# Patient Record
(demographics unavailable — no encounter records)

---

## 2025-03-25 NOTE — ASSESSMENT
[FreeTextEntry1] : 47-year-old male with pmh of HTN, HLD, anxiety presents for consultation of elevated LFTs, hepatosplenomegaly, and fatty liver.  Hepatosplenomegaly -CT abdo ordered   Elevated LFTs -Labs ordered to better establish etiology  Fatty liver -Weight loss through diet and exercise advised  Alcohol use disorder  -Counseled on cessation of alcohol use    Michelle MAYNARD NP, acted as scribe for JERICHO Moore for this patient encounter.

## 2025-03-25 NOTE — HISTORY OF PRESENT ILLNESS
[de-identified] : 47-year-old male with pmh of HTN, HLD, anxiety presents for consultation of elevated LFTs and fatty liver.   Alcohol consumption: 2-5 drinks about 4 days a week  Tattoos: no   OTC or herbal supplements: no Surgical history: no Family history/ Liver disease:  no Social history:   Symptoms like fatigue, pruritus, recent infection: no Abdominal distension: no Skin changes like jaundice: Any blood in poop or vomiting blood: no Any confusion: no unintentional weight loss or gain: no Family history of sudden death or late trimester miscarriages: no New medication: started valsartan, rosuvastatin, buproprion all in the past year

## 2025-03-25 NOTE — HISTORY OF PRESENT ILLNESS
[de-identified] : 47-year-old male with pmh of HTN, HLD, anxiety presents for consultation of elevated LFTs and fatty liver.   Alcohol consumption: 2-5 drinks about 4 days a week  Tattoos: no   OTC or herbal supplements: no Surgical history: no Family history/ Liver disease:  no Social history:   Symptoms like fatigue, pruritus, recent infection: no Abdominal distension: no Skin changes like jaundice: Any blood in poop or vomiting blood: no Any confusion: no unintentional weight loss or gain: no Family history of sudden death or late trimester miscarriages: no New medication: started valsartan, rosuvastatin, buproprion all in the past year